# Patient Record
Sex: FEMALE | URBAN - METROPOLITAN AREA
[De-identification: names, ages, dates, MRNs, and addresses within clinical notes are randomized per-mention and may not be internally consistent; named-entity substitution may affect disease eponyms.]

---

## 2020-06-09 ENCOUNTER — TELEPHONE (OUTPATIENT)
Dept: OTOLARYNGOLOGY | Facility: CLINIC | Age: 50
End: 2020-06-09

## 2020-06-09 NOTE — TELEPHONE ENCOUNTER
----- Message from Darcie Levin sent at 6/9/2020 10:53 AM CDT -----  Contact: patient  Please call above patient at [phone] need to change appointment date and time waiting on a call from the nurse.

## 2020-06-18 ENCOUNTER — OFFICE VISIT (OUTPATIENT)
Dept: OTOLARYNGOLOGY | Facility: CLINIC | Age: 50
End: 2020-06-18
Payer: OTHER GOVERNMENT

## 2020-06-18 VITALS
DIASTOLIC BLOOD PRESSURE: 79 MMHG | SYSTOLIC BLOOD PRESSURE: 131 MMHG | WEIGHT: 120.56 LBS | HEART RATE: 88 BPM | TEMPERATURE: 98 F

## 2020-06-18 DIAGNOSIS — J38.7 LEUKOPLAKIA OF LARYNX: ICD-10-CM

## 2020-06-18 DIAGNOSIS — K21.9 GASTROESOPHAGEAL REFLUX DISEASE, ESOPHAGITIS PRESENCE NOT SPECIFIED: ICD-10-CM

## 2020-06-18 DIAGNOSIS — R09.89 THROAT CLEARING: ICD-10-CM

## 2020-06-18 DIAGNOSIS — R49.0 DYSPHONIA: Primary | ICD-10-CM

## 2020-06-18 PROCEDURE — 99999 PR PBB SHADOW E&M-EST. PATIENT-LVL III: CPT | Mod: PBBFAC,,, | Performed by: OTOLARYNGOLOGY

## 2020-06-18 PROCEDURE — 31579 LARYNGOSCOPY TELESCOPIC: CPT | Mod: S$PBB,,, | Performed by: OTOLARYNGOLOGY

## 2020-06-18 PROCEDURE — 99203 OFFICE O/P NEW LOW 30 MIN: CPT | Mod: 25,S$PBB,, | Performed by: OTOLARYNGOLOGY

## 2020-06-18 PROCEDURE — 99203 PR OFFICE/OUTPT VISIT, NEW, LEVL III, 30-44 MIN: ICD-10-PCS | Mod: 25,S$PBB,, | Performed by: OTOLARYNGOLOGY

## 2020-06-18 PROCEDURE — 99999 PR PBB SHADOW E&M-EST. PATIENT-LVL III: ICD-10-PCS | Mod: PBBFAC,,, | Performed by: OTOLARYNGOLOGY

## 2020-06-18 PROCEDURE — 31579 PR LARYNGOSCOPY, FLEX/RIGID TELESCOPIC, W/STROBOSCOPY: ICD-10-PCS | Mod: S$PBB,,, | Performed by: OTOLARYNGOLOGY

## 2020-06-18 PROCEDURE — 99213 OFFICE O/P EST LOW 20 MIN: CPT | Mod: PBBFAC | Performed by: OTOLARYNGOLOGY

## 2020-06-18 PROCEDURE — 31579 LARYNGOSCOPY TELESCOPIC: CPT | Mod: PBBFAC | Performed by: OTOLARYNGOLOGY

## 2020-06-18 RX ORDER — FLUOXETINE 10 MG/1
10 CAPSULE ORAL DAILY
COMMUNITY

## 2020-06-18 RX ORDER — AMITRIPTYLINE HYDROCHLORIDE 75 MG/1
75 TABLET ORAL NIGHTLY
COMMUNITY

## 2020-06-18 RX ORDER — RIZATRIPTAN BENZOATE 10 MG/1
10 TABLET ORAL
COMMUNITY

## 2020-06-18 NOTE — PATIENT INSTRUCTIONS
THROAT CLEARING     Why am I clearing my throat so often?   Irritation of the vocal folds and surrounding area can cause the urge to clear your throat. This irritation can be caused by acid reflux, allergies, or environmental factors, as well as from a cold or sore throat. Talk with your doctor about the treatment of possible underlying causes. However, throat clearing can turn into a cycle. You clear your throat after feeling an irritation, which causes more irritation, which causes you to continue clearing your throat, which causes more irritation.     What can I do about it?   · Ask a friend or family member to help you keep track - you may not even realize how often you do it.   · Replace the throat clearing with a different behavior.   · Take a sip of water and then swallow. Repeat until the sensation subsides.  · Swallow hard (even without water)   · Use the silent throat clear method by making the h sound when you exhale  · Breathe in deeply through your nose and exhale on shhh   · Hum quietly   · Keep yourself hydrated.   · Drink plenty of water   · Eat wet snacks (grapes, apples, melon, cucumber)   · Use a humidifier at home or at work   · Suck on hard candies, but avoid too much sugar and avoid anything with mint, menthol, camphor, or eucaplyptus, as these will have a more irritating effect.

## 2020-06-18 NOTE — PROGRESS NOTES
[facility]  Department of Otorhinolaryngology and Communication Sciences    Caroline Loredo is a 49 y.o. female who presents to the [facility] for consultation at the kind request of Dr. Bruce Sanchez for further management of cough/throat clearing.    She complains of chronic throat clearing Onset was gradual. Symptoms are moderate. Duration is 10 years. Time course is intermittent. Symptoms are worsening. She denies any exacerbating factors. Alleviating factors include Gaviscon. She denies any associated symptoms. There have not been prior episodes.    She has undergone extensive workup and medical treatment for reflux. She is not actively smoking.  She quit in 2006. She denies otalgia, hemoptysis, hematemesis, fevers, chills, sweats, involuntary weight loss, neck mass.    She is on elavil (rec'd by GI, neurology for migraines, and ENT rx for tinnitus) 75mg.    Her last EGD was about 1 year ago - mild hiatal hernia. Had dual channel pH probe testing about 4 yrs ago via  [facility]. Does not recall results. Also has had Bravo. Understands it showed some reflux but not sufficient amount for her to meet criteria for a study. She apparently had planned to undergo Sttretta a few years ago, but there was some limitation relatedto insurance coverage for this.    Gaviscon is the only thing that helps.  Takes this PRN. Had tried a very restrictive diet for physique training but this did not impact symptoms.      She drinks 1-2 caffeinated beverage(s) per day. She drinks plenty of water per day.      Past Medical History  Migraines  Tinnitus  Rosacea    Past Surgical History  Shoulder surgery (laparoscopic, both shoulders)  Appendectomy  Sinus surgeries    Family History  Her family history is not on file.    Social History  She reports that she has quit smoking. She has never used smokeless tobacco.    Allergies  She is allergic to codeine.    Medications  She has a current medication list which includes  the following prescription(s): amitriptyline, fluoxetine, and rizatriptan.    Review of Systems   Constitutional: Negative for fever.   HENT: Negative for sore throat.    Eyes: Negative for visual disturbance.   Respiratory: Negative for wheezing.    Cardiovascular: Negative for chest pain.   Gastrointestinal: Negative for nausea.   Musculoskeletal: Negative for arthralgias.   Skin: Negative for rash.   Neurological: Negative for tremors.   Hematological: Does not bruise/bleed easily.   Psychiatric/Behavioral: The patient is not nervous/anxious.           Objective:     /79   Pulse 88   Temp 98.2 °F (36.8 °C)   Wt 54.7 kg (120 lb 9.5 oz)    Physical Exam    Constitutional: comfortable, well dressed  Psychiatric: appropriate affect  Respiratory: comfortably breathing, symmetric chest rise, no stridor  Voice: minimal variable roughness  Cardiovascular: upper extremities non-edematous  Lymphatic: no cervical lymphadenopathy  Neurologic: cranial nerves 3-12 grossly intact  Head: normocephalic  Eyes: conjunctivae and sclerae clear  Ears: normal pinnae, normal external auditory canals, tympanic membranes intact  Nose: mucosa pink and noncongested, no masses, no mucopurulence, no polyps  Oral cavity / oropharynx: no mucosal lesions  Neck: soft, full range of motion, laryngotracheal complex palpable with appropriate landmarks, larynx elevates on swallowing; + TTP bilateral thyrohyoid space  Indirect laryngoscopy: limited due to gag    Procedure  Flexible Laryngoscopy (51564): Laryngoscopy is indicated for assessment of upper aerodigestive structure and function. This was carried out transnasally with a distal chip videoendoscope. After verbal consent was obtained, the patient was positioned and the nose was topically decongested with 1% phenylephrine and topically anesthetized with 4% lidocaine. The endoscope was passed through the most patent nasal cavity and positioned to image the nasopharynx, larynx, and  hypopharynx in detail. The following features were examined: nasopharyngeal, laryngeal, hypopharyngeal masses; velopharyngeal strength, closure, and symmetry of motion; vocal fold range and symmetry of motion; laryngeal mucosal edema, erythema, inflammation, and hydration; salivary pooling; and gross laryngeal sensation. The equipment was removed. The patient tolerated the procedure well without complication. All findings were normal except:  - bilateral pseudosulcus vocalis  - linear leukoplakia streak along infraglottic right true vocal fold near anterior commissure      Assessment:     Caroline Loredo is a 49 y.o. female with chronic throat clearing and reflux. 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    Plan:        I had a discussion with the patient regarding her condition and the further workup and management options.      SLP voice evaluation and subsequent therapy sessions, with emphasis on laryngeal control, are medically necessary for restoration of laryngeal function. We will arrange for this to occur here at the [facility] in the coming weeks.    Other treatment to consider might include SLN block. If the leukoplakia persists, we could consider empiric treatment for fungus and/or biopsy.    She will follow up with me in 2-3 months.    All questions were answered, and the patient is in agreement with the above.     Stu Larios M.D.  [facility]  Department of Otorhinolaryngology and Communication Sciences

## 2020-06-18 NOTE — LETTER
June 18, 2020      Bruce Sanchez MD  [address] [place] [address] [address]  [address]  [place] [address]           [facility]  [address]  [facility] [address]  Phone: [phone]  Fax: [phone]          Patient: Caroline Loredo   MR Number: [id number]   YOB: 1970   Date of Visit: 6/18/2020       Dear Dr. Bruce Sanchez:    Thank you for referring Caroline Loredo to me for evaluation. Attached you will find relevant portions of my assessment and plan of care.    If you have questions, please do not hesitate to call me. I look forward to following Caroline Loredo along with you.    Sincerely,    Stu Larios MD    Enclosure  CC:  No Recipients    If you would like to receive this communication electronically, please contact [email or web address] or [phone] to request more information on [employer] access.    For providers and/or their staff who would like to refer a patient to [facility], please contact us through our one-stop-shop provider referral line, [facility], at [phone].    If you feel you have received this communication in error or would no longer like to receive these types of communications, please e-mail [email or web address]

## 2020-07-09 ENCOUNTER — CLINICAL SUPPORT (OUTPATIENT)
Dept: SPEECH THERAPY | Facility: HOSPITAL | Age: 50
End: 2020-07-09
Attending: OTOLARYNGOLOGY
Payer: OTHER GOVERNMENT

## 2020-07-09 DIAGNOSIS — R09.89 THROAT CLEARING: ICD-10-CM

## 2020-07-09 DIAGNOSIS — J38.7 LEUKOPLAKIA OF LARYNX: ICD-10-CM

## 2020-07-09 DIAGNOSIS — K21.9 GASTROESOPHAGEAL REFLUX DISEASE, ESOPHAGITIS PRESENCE NOT SPECIFIED: ICD-10-CM

## 2020-07-09 DIAGNOSIS — R49.0 DYSPHONIA: ICD-10-CM

## 2020-07-09 PROCEDURE — 92524 BEHAVRAL QUALIT ANALYS VOICE: CPT | Mod: GN,95 | Performed by: SPEECH-LANGUAGE PATHOLOGIST

## 2020-07-09 NOTE — PROGRESS NOTES
Referring provider: Dr. Stu Larios  Reason for visit:  Behavioral and qualitative analysis of voice and resonance (CPT 95788)    The patient location is: [place]  The chief complaint leading to consultation is: chronic cough  Visit type: audiovisual  Face to Face time with patient: 35  45 minutes of total time spent on the encounter, which includes face to face time and non-face to face time preparing to see the patient (eg, review of tests), Obtaining and/or reviewing separately obtained history, Documenting clinical information in the electronic or other health record, Independently interpreting results (not separately reported) and communicating results to the patient/family/caregiver, or Care coordination (not separately reported).   Each patient to whom he or she provides medical services by telemedicine is:  (1) informed of the relationship between the physician and patient and the respective role of any other health care provider with respect to management of the patient; and (2) notified that he or she may decline to receive medical services by telemedicine and may withdraw from such care at any time.    Subjective / History    Caroline Loredo is a 49 y.o. female referred for voice evaluation by Dr. Larios.  She presents with complaints of chronic throat clearing which began many years ago.  Patient also reported the following complaints/associated symptoms: difficulty with swallowing/intermittent choking on solids and occasionally saliva, which has worsened over the past ~2 weeks but has been on and off for several years.  She is unable to identify any specific exacerbating/triggering factors.  She reports gaviscon as alleviating factors.  She eats a very healthy diet.  Notes terri reflux symptoms (regurge, sour taste) especially when doing certain workouts.  Notes a recent worsening of a chronic swallowing issue during which she coughs on a small piece of food.   -Description of the cough: all  day, intermittently, light/dry cough and staying the same   -Patient endorses globus, 'morning voice', burning in throat, frequent throat clearing and regurgitation  -Smokin packs/day   -Caffeine: <1 cups/day   -Water: plenty    Laryngoscopy findings (per Dr. Larios on )  - bilateral pseudosulcus vocalis  - linear leukoplakia streak along infraglottic right true vocal fold near anterior commissure     No past medical history on file.  Current Outpatient Medications on File Prior to Visit   Medication Sig Dispense Refill    amitriptyline (ELAVIL) 75 MG tablet Take 75 mg by mouth every evening.      FLUoxetine 10 MG capsule Take 10 mg by mouth once daily.      rizatriptan (MAXALT) 10 MG tablet Take 10 mg by mouth as needed for Migraine.       No current facility-administered medications on file prior to visit.         Objective    Perceptual/behavioral assessment  -CAPE-V Overall Score: 16  -Quality: mild strain/roughness  -Volume: appropriate for age and gender identity  -Pitch: appropriate for age and gender identity  -Flexibility: appropriate for age and gender identity  -Habitual respiratory pattern: chest/clavicular    Education / Stimulability Trials  Discussed importance of vocal hygiene including: hydration, conservation, reducing caffeine/drying agents, reducing throat clearing, coughing, other phonotraumatic behaviors and improving laryngeal environment by reducing contact with external/environmental cough-triggering factors. Assisted in training patient on antecedent sensations/behaviors which trigger the cough, which may include a tickle in the throat.  In order to optimize laryngeal environment, discussed elimination of cough drops, caffeine, dry environments and food/drinks that exacerbate GERD/LPR, and encouraged hydration, swallowing and increasing ambient humidity. Patient was stimulable for participation in more efficient breathing and cough avoidance strategies, including eating ice chips  or sipping water, performing effortful swallow and nasal inhalation to humidify air and abduct vocal folds.       Functional goals  Length Status Goal   Long term Initiated Patient will implement and adhere to vocal hygiene protocols on a daily basis, including the elimination of phonotraumatic behaviors.    Long term Initiated  Patient and clinician will facilitate changes in laryngeal function in order to restore functional use of voice and breathing for daily occupational, social, and emotional demands.     Long term Initiated Patient will implement and adhere to open larynx breathing protocols and voice hygiene protocols on a daily basis.    Short term Initiated Patient will increase awareness of phonotraumatic behaviors including coughing, throat clearing, and strained voicing through self-monitoring to facilitate generalization in functional situations with 80% accuracy.   Short term Initiated  Patient will clear their throat fewer than 5 times during session.    Short term Initiated   Patient will identify the antecedent sensations associated with coughing/throat clearing.    Short term Initiated  Patient will implement and adhere to laryngeal desensitization protocol as outlined to them which includes several hard swallows to reduce irritability.       Assessment     Patient presents with chronic throat clearing as diagnosed by Dr. Larios.  Prognosis for continued improvement is good.     Recommendations / POC    Recommend 1-3 sessions of voice/speech therapy over 2-6 weeks with a speech-language pathologist to optimize glottal postures for improved vocal function, vocal efficiency, ease of phonation, as well as a reduction in coughing/throat clearing.  She should continue the exercises as discussed in session and should contact me with any further questions.    Will f/u with patient regarding swallowing function at next visit to determine if MBSS should be requested.

## 2020-10-21 ENCOUNTER — TELEPHONE (OUTPATIENT)
Dept: OTOLARYNGOLOGY | Facility: CLINIC | Age: 50
End: 2020-10-21

## 2020-10-21 NOTE — TELEPHONE ENCOUNTER
----- Message from Kim Stockton sent at 10/21/2020  1:47 PM CDT -----  Regarding: Covid Testing  Reason: Patient would like to reschedule her appt. Had Covid back Sept and has been cleared to go back to work was advised by employee health to not take another Covid test for at least 90 days cause it can still come back positive. Patient would like to know if she can get a antibodies instead          Contact: [phone]

## 2020-10-29 ENCOUNTER — OFFICE VISIT (OUTPATIENT)
Dept: OTOLARYNGOLOGY | Facility: CLINIC | Age: 50
End: 2020-10-29
Payer: OTHER GOVERNMENT

## 2020-10-29 VITALS — WEIGHT: 121.25 LBS | HEART RATE: 99 BPM | SYSTOLIC BLOOD PRESSURE: 130 MMHG | DIASTOLIC BLOOD PRESSURE: 87 MMHG

## 2020-10-29 DIAGNOSIS — R09.89 THROAT CLEARING: Primary | ICD-10-CM

## 2020-10-29 DIAGNOSIS — K21.9 GASTROESOPHAGEAL REFLUX DISEASE, UNSPECIFIED WHETHER ESOPHAGITIS PRESENT: ICD-10-CM

## 2020-10-29 PROCEDURE — 31575 DIAGNOSTIC LARYNGOSCOPY: CPT | Mod: PBBFAC | Performed by: OTOLARYNGOLOGY

## 2020-10-29 PROCEDURE — 99999 PR PBB SHADOW E&M-EST. PATIENT-LVL III: ICD-10-PCS | Mod: PBBFAC,,, | Performed by: OTOLARYNGOLOGY

## 2020-10-29 PROCEDURE — 99213 OFFICE O/P EST LOW 20 MIN: CPT | Mod: 25,S$PBB,, | Performed by: OTOLARYNGOLOGY

## 2020-10-29 PROCEDURE — 99213 PR OFFICE/OUTPT VISIT, EST, LEVL III, 20-29 MIN: ICD-10-PCS | Mod: 25,S$PBB,, | Performed by: OTOLARYNGOLOGY

## 2020-10-29 PROCEDURE — 99213 OFFICE O/P EST LOW 20 MIN: CPT | Mod: PBBFAC,25 | Performed by: OTOLARYNGOLOGY

## 2020-10-29 PROCEDURE — 31575 DIAGNOSTIC LARYNGOSCOPY: CPT | Mod: S$PBB,,, | Performed by: OTOLARYNGOLOGY

## 2020-10-29 PROCEDURE — 99999 PR PBB SHADOW E&M-EST. PATIENT-LVL III: CPT | Mod: PBBFAC,,, | Performed by: OTOLARYNGOLOGY

## 2020-10-29 PROCEDURE — 31575 PR LARYNGOSCOPY, FLEXIBLE; DIAGNOSTIC: ICD-10-PCS | Mod: S$PBB,,, | Performed by: OTOLARYNGOLOGY

## 2020-10-29 RX ORDER — ERENUMAB-AOOE 140 MG/ML
140 INJECTION, SOLUTION SUBCUTANEOUS
COMMUNITY

## 2020-10-29 RX ORDER — OXCARBAZEPINE 150 MG/1
150 TABLET, FILM COATED ORAL 2 TIMES DAILY
COMMUNITY

## 2020-10-29 NOTE — PATIENT INSTRUCTIONS
Things to consider:  - working more with the speech therapist  - trial superior laryngeal nerve block  - trial of gabapentin     Follow up with your gastroenterologist to discuss improving reflux control        Gabapentin capsules or tablets  What is this medicine?  GABAPENTIN (GA ba pen tin) is used to control partial seizures in adults with epilepsy. It is also used to treat certain types of nerve pain.  How should I use this medicine?  Take this medicine by mouth with a glass of water. Follow the directions on the prescription label. You can take it with or without food. If it upsets your stomach, take it with food.Take your medicine at regular intervals. Do not take it more often than directed. Do not stop taking except on your doctor's advice.  If you are directed to break the 600 or 800 mg tablets in half as part of your dose, the extra half tablet should be used for the next dose. If you have not used the extra half tablet within 28 days, it should be thrown away.  A special MedGuide will be given to you by the pharmacist with each prescription and refill. Be sure to read this information carefully each time.  Talk to your pediatrician regarding the use of this medicine in children. Special care may be needed.  What side effects may I notice from receiving this medicine?  Side effects that you should report to your doctor or health care professional as soon as possible:  · allergic reactions like skin rash, itching or hives, swelling of the face, lips, or tongue  · worsening of mood, thoughts or actions of suicide or dying  Side effects that usually do not require medical attention (report to your doctor or health care professional if they continue or are bothersome):  · constipation  · difficulty walking or controlling muscle movements  · dizziness  · nausea  · slurred speech  · tiredness  · tremors  · weight gain  What may interact with this medicine?  Do not take this medicine with any of the following  medications:  · other gabapentin products  This medicine may also interact with the following medications:  · alcohol  · antacids  · antihistamines for allergy, cough and cold  · certain medicines for anxiety or sleep  · certain medicines for depression or psychotic disturbances  · homatropine; hydrocodone  · naproxen  · narcotic medicines (opiates) for pain  · phenothiazines like chlorpromazine, mesoridazine, prochlorperazine, thioridazine  What if I miss a dose?  If you miss a dose, take it as soon as you can. If it is almost time for your next dose, take only that dose. Do not take double or extra doses.  Where should I keep my medicine?  Keep out of reach of children.  This medicine may cause accidental overdose and death if it taken by other adults, children, or pets. Mix any unused medicine with a substance like cat litter or coffee grounds. Then throw the medicine away in a sealed container like a sealed bag or a coffee can with a lid. Do not use the medicine after the expiration date.  Store at room temperature between 15 and 30 degrees C (59 and 86 degrees F).  What should I tell my health care provider before I take this medicine?  They need to know if you have any of these conditions:  · kidney disease  · suicidal thoughts, plans, or attempt; a previous suicide attempt by you or a family member  · an unusual or allergic reaction to gabapentin, other medicines, foods, dyes, or preservatives  · pregnant or trying to get pregnant  · breast-feeding  What should I watch for while using this medicine?  Visit your doctor or health care professional for regular checks on your progress. You may want to keep a record at home of how you feel your condition is responding to treatment. You may want to share this information with your doctor or health care professional at each visit. You should contact your doctor or health care professional if your seizures get worse or if you have any new types of seizures. Do not  stop taking this medicine or any of your seizure medicines unless instructed by your doctor or health care professional. Stopping your medicine suddenly can increase your seizures or their severity.  Wear a medical identification bracelet or chain if you are taking this medicine for seizures, and carry a card that lists all your medications.  You may get drowsy, dizzy, or have blurred vision. Do not drive, use machinery, or do anything that needs mental alertness until you know how this medicine affects you. To reduce dizzy or fainting spells, do not sit or stand up quickly, especially if you are an older patient. Alcohol can increase drowsiness and dizziness. Avoid alcoholic drinks.  Your mouth may get dry. Chewing sugarless gum or sucking hard candy, and drinking plenty of water will help.  The use of this medicine may increase the chance of suicidal thoughts or actions. Pay special attention to how you are responding while on this medicine. Any worsening of mood, or thoughts of suicide or dying should be reported to your health care professional right away.  Women who become pregnant while using this medicine may enroll in the [place] Antiepileptic Drug Pregnancy Registry by calling 1-661.473.6862. This registry collects information about the safety of antiepileptic drug use during pregnancy.  NOTE:This sheet is a summary. It may not cover all possible information. If you have questions about this medicine, talk to your doctor, pharmacist, or health care provider. Copyright© 2017 [employer]

## 2020-10-29 NOTE — PROGRESS NOTES
[facility]  Department of Otorhinolaryngology and Communication Sciences    Subjective:      Caroline Loredo is a 49 y.o. female who presents for follow-up. She has chronic throat clearing (10+ years in duration) and reflux. 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     She had 1 session of SLP voice eval/treat and did not reschedule a subsequent session. She had COVID-19 illness in Sept.     She reports that lately the throat clearing has been intrusive. It does come and go somewhat. She continues to take her Gaviscon Advanc. She has again resumed taking omeprazole daily and finds this to he helpful. She did not find the SLP treatment strategies to be beneficial.    Her last EGD was over 1 year ago - mild hiatal hernia. Had dual channel pH probe testing 4+ yrs ago via  [facility]. Does not recall results. Also has had Bravo. Understands it showed some reflux but not sufficient amount for her to meet criteria for a study. She apparently had planned to undergo Stretta a few years ago, but there was some limitation related to insurance coverage for this. She usually sees Neel Gandhi in GI, although it has been a while since she followed up with him.    She lately has been noticing nasal obstruction. She in the past had an in-office procedure with Dr. Sanchez to help address this. She wonders if this needs to be repeated.    She takes Elavil 75 mg QHS. There are some other neurotropic/psychotropic medications in her profile.    The patient's medications, allergies, past medical, surgical, social and family histories were reviewed and updated as appropriate.    A detailed review of systems was obtained with pertinent positives as per the above HPI, and otherwise negative.      Objective:     /87   Pulse 99   Wt 55 kg (121 lb 4.1 oz)      Constitutional: comfortable, well dressed  Psychiatric: appropriate  affect  Respiratory: comfortably breathing, symmetric chest rise, no stridor  Voice: minimal variable roughness  Head: normocephalic  Eyes: conjunctivae and sclerae clear  Neck: soft, full range of motion, laryngotracheal complex palpable with appropriate landmarks, larynx elevates on swallowing; thyrohyoid space nontender  Nose: septal deviation, left>right  Indirect laryngoscopy is limited due to gag    Procedure  Flexible Laryngoscopy (51536): Laryngoscopy is indicated for assessment of upper aerodigestive structure and function. This was carried out transnasally with a distal chip videoendoscope. After verbal consent was obtained, the patient was positioned and the nose was topically decongested with 1% phenylephrine and topically anesthetized with 4% lidocaine. The endoscope was passed through the most patent nasal cavity and positioned to image the nasopharynx, larynx, and hypopharynx in detail. The following features were examined: nasopharyngeal, laryngeal, hypopharyngeal masses; velopharyngeal strength, closure, and symmetry of motion; vocal fold range and symmetry of motion; laryngeal mucosal edema, erythema, inflammation, and hydration; salivary pooling; and gross laryngeal sensation. The equipment was removed. The patient tolerated the procedure well without complication. 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 female with chronic throat clearing (10+ years in duration) and reflux. I suspect contributions to her symptoms from laryngopharyngeal sensory neuropathy. Today she also demonstrates findings of allergic rhinitis. She also complains of nasal obstruction.     Plan:     The patient is reassured that these symptoms do not appear to represent a serious or threatening condition.    I recommended adherence to her SLP strategies  for laryngeal control/respiratory retraining.    I educated the patient on the potential impact of reflux on laryngopharyngeal disorders and provided suggestions on diet and lifestyle modifications that may help improve control of ongoing reflux. She may benefit from daily usage of Gaviscon Advance. I recommended she re-connect with her gastroenterologist to discuss optimization of her reflux control.    She will consider meeting again with Dr. Sanchez or with one of my departmental colleagues to discuss her nasal airway symptoms in more detail. I offered to set her up for evaluation by allergy/immunology but she defers on this at present.    She will follow up with me in the future on an as-needed basis. Depending on her progress and medication profile, we could consider a trial of an alternate PO neuromodulator and/or a trial SLN block procedure.    All questions were answered, and the patient is in agreement with the plan.    Stu Larios M.D.  [facility]  Department of Otorhinolaryngology and Communication Sciences

## 2021-04-15 ENCOUNTER — PATIENT MESSAGE (OUTPATIENT)
Dept: RESEARCH | Facility: HOSPITAL | Age: 51
End: 2021-04-15

## 2021-05-07 ENCOUNTER — HOSPITAL ENCOUNTER (EMERGENCY)
Facility: HOSPITAL | Age: 51
Discharge: HOME | End: 2021-05-07
Attending: EMERGENCY MEDICINE
Payer: OTHER GOVERNMENT

## 2021-05-07 VITALS
DIASTOLIC BLOOD PRESSURE: 82 MMHG | OXYGEN SATURATION: 99 % | SYSTOLIC BLOOD PRESSURE: 118 MMHG | WEIGHT: 118 LBS | TEMPERATURE: 98 F | RESPIRATION RATE: 20 BRPM | HEART RATE: 84 BPM | BODY MASS INDEX: 23.16 KG/M2 | HEIGHT: 60 IN

## 2021-05-07 DIAGNOSIS — K59.00 CONSTIPATION: ICD-10-CM

## 2021-05-07 PROCEDURE — 99283 EMERGENCY DEPT VISIT LOW MDM: CPT | Mod: 25,ER

## 2021-05-07 PROCEDURE — 25000003 PHARM REV CODE 250: Mod: ER | Performed by: EMERGENCY MEDICINE

## 2021-05-07 RX ORDER — SYRING-NEEDL,DISP,INSUL,0.3 ML 29 G X1/2"
296 SYRINGE, EMPTY DISPOSABLE MISCELLANEOUS
Status: COMPLETED | OUTPATIENT
Start: 2021-05-07 | End: 2021-05-07

## 2021-05-07 RX ORDER — SYRING-NEEDL,DISP,INSUL,0.3 ML 29 G X1/2"
296 SYRINGE, EMPTY DISPOSABLE MISCELLANEOUS ONCE AS NEEDED
Qty: 295 ML | Refills: 0 | Status: SHIPPED | OUTPATIENT
Start: 2021-05-07 | End: 2021-05-07

## 2021-05-07 RX ORDER — PSEUDOEPHEDRINE/ACETAMINOPHEN 30MG-500MG
100 TABLET ORAL
Status: COMPLETED | OUTPATIENT
Start: 2021-05-07 | End: 2021-05-07

## 2021-05-07 RX ORDER — VALACYCLOVIR HYDROCHLORIDE 500 MG/1
500 TABLET, FILM COATED ORAL DAILY
COMMUNITY
Start: 2020-09-29

## 2021-05-07 RX ORDER — POLYETHYLENE GLYCOL 3350 17 G/17G
17 POWDER, FOR SOLUTION ORAL DAILY
Qty: 119 G | Refills: 0 | Status: SHIPPED | OUTPATIENT
Start: 2021-05-07

## 2021-05-07 RX ADMIN — MAGNESIUM CITRATE 296 ML: 1.75 LIQUID ORAL at 04:05

## 2021-05-07 RX ADMIN — Medication 100 ML: at 03:05

## 2021-05-07 RX ADMIN — MAGNESIUM CITRATE 296 ML: 1.75 LIQUID ORAL at 03:05

## 2021-05-07 RX ADMIN — SODIUM CHLORIDE 500 ML: 0.9 INJECTION, SOLUTION INTRAVENOUS at 03:05

## 2025-08-16 ENCOUNTER — HOSPITAL ENCOUNTER (EMERGENCY)
Facility: HOSPITAL | Age: 55
Discharge: HOME | End: 2025-08-16
Attending: EMERGENCY MEDICINE
Payer: OTHER GOVERNMENT

## 2025-08-16 DIAGNOSIS — U07.1 COVID-19: Primary | ICD-10-CM

## 2025-08-16 LAB
CTP QC/QA: YES
INFLUENZA A ANTIGEN, POC: NEGATIVE
INFLUENZA B ANTIGEN, POC: NEGATIVE
POC RAPID STREP A: NEGATIVE
SARS-COV-2 RDRP RESP QL NAA+PROBE: POSITIVE

## 2025-08-16 PROCEDURE — 25000003 PHARM REV CODE 250: Mod: ER

## 2025-08-16 PROCEDURE — 87804 INFLUENZA ASSAY W/OPTIC: CPT | Mod: XS,ER

## 2025-08-16 PROCEDURE — 87880 STREP A ASSAY W/OPTIC: CPT | Mod: ER

## 2025-08-16 PROCEDURE — 87635 SARS-COV-2 COVID-19 AMP PRB: CPT | Mod: ER

## 2025-08-16 PROCEDURE — 99284 EMERGENCY DEPT VISIT MOD MDM: CPT | Mod: ER

## 2025-08-16 RX ORDER — ACETAMINOPHEN 500 MG
500 TABLET ORAL EVERY 6 HOURS PRN
Qty: 20 TABLET | Refills: 0 | Status: SHIPPED | OUTPATIENT
Start: 2025-08-16

## 2025-08-16 RX ORDER — CETIRIZINE HYDROCHLORIDE 10 MG/1
10 TABLET ORAL DAILY
Qty: 30 TABLET | Refills: 0 | Status: SHIPPED | OUTPATIENT
Start: 2025-08-16 | End: 2025-09-15

## 2025-08-16 RX ORDER — BENZONATATE 100 MG/1
100 CAPSULE ORAL 3 TIMES DAILY PRN
Qty: 20 CAPSULE | Refills: 0 | Status: SHIPPED | OUTPATIENT
Start: 2025-08-16 | End: 2025-08-26

## 2025-08-16 RX ORDER — ACETAMINOPHEN 500 MG
1000 TABLET ORAL
Status: COMPLETED | OUTPATIENT
Start: 2025-08-16 | End: 2025-08-16

## 2025-08-16 RX ORDER — FLUTICASONE PROPIONATE 50 MCG
1 SPRAY, SUSPENSION (ML) NASAL 2 TIMES DAILY PRN
Qty: 15 G | Refills: 0 | Status: SHIPPED | OUTPATIENT
Start: 2025-08-16

## 2025-08-16 RX ADMIN — ACETAMINOPHEN 1000 MG: 500 TABLET ORAL at 06:08

## 2025-08-17 VITALS
WEIGHT: 125 LBS | SYSTOLIC BLOOD PRESSURE: 116 MMHG | HEART RATE: 110 BPM | TEMPERATURE: 98 F | HEIGHT: 60 IN | DIASTOLIC BLOOD PRESSURE: 85 MMHG | BODY MASS INDEX: 24.54 KG/M2 | OXYGEN SATURATION: 98 % | RESPIRATION RATE: 18 BRPM